# Patient Record
(demographics unavailable — no encounter records)

---

## 2025-04-14 NOTE — PROCEDURE
[Posterior Lesion] : posterior lesion [Anterior rhinoscopy insufficient to account for symptoms] : anterior rhinoscopy insufficient to account for symptoms [Topical Lidocaine] : topical lidocaine [Oxymetazoline HCl] : oxymetazoline HCl [Flexible Endoscope] : examined with the flexible endoscope [Allergic] : allergic signs [] : hypertrophy present bilateral [Normal] : the nasopharynx was normal [Serial Number: ___] : Serial Number: [unfilled] [de-identified] : done because dentist advised her sinuses had matl in them on xray and could not see with speculum [FreeTextEntry6] : Mucosa: b ar Mucus:b nl Polyps:b nl Inferior turbinate:b nl Middle turbinate:b nl Superior turbinate:b nl Inferior Meatus:b nl Middle Meatus:b nl Superior meatus:b nl Sphenoethmoidal recess:b nl

## 2025-04-14 NOTE — PHYSICAL EXAM
[Nasal Endoscopy Performed] : nasal endoscopy was performed, see procedure section for findings [Midline] : trachea located in midline position [Normal] : no rashes [de-identified] : b crepitance

## 2025-04-14 NOTE — ASSESSMENT
[FreeTextEntry1] : recent uri- may have had sinus infx- has sinus and ear complaints but on d 5 of augmentin exam is clear  tymps and ett nl explained best not to fly if ill but if does rec afrin and earplanes rec finiish abx to lay off all meds ordered x augmentin, flonase rtc with sinus ct see dentist for continued tmj mgmt

## 2025-04-14 NOTE — HISTORY OF PRESENT ILLNESS
[de-identified] : 28 yo f had uri last week. She felt pressure in the back of her throat and postnasal drip - when she gets a uri gets this and then coughs- used Navasge after rose this and then felt pressure in b in ears so stopped. The next night felt pain in face in preauricular areas radiating downward and ears felt pressure - she went to urgent care and was told she had an inner ear infection. Has a left upper cavity and went to dentist - told sinuses looked full on xray . She feels something like liquid moving in her ears when she moves her head from side to side. Dentist told her she has tmj they made many recommendations. No fh or sh relevant to cc. Nonsmoker. has a 16 mo old child. Flying to [facility] and wants to assess safety.

## 2025-04-14 NOTE — HISTORY OF PRESENT ILLNESS
[de-identified] : 28 yo f had uri last week. She felt pressure in the back of her throat and postnasal drip - when she gets a uri gets this and then coughs- used Navasge after rose this and then felt pressure in b in ears so stopped. The next night felt pain in face in preauricular areas radiating downward and ears felt pressure - she went to urgent care and was told she had an inner ear infection. Has a left upper cavity and went to dentist - told sinuses looked full on xray . She feels something like liquid moving in her ears when she moves her head from side to side. Dentist told her she has tmj they made many recommendations. No fh or sh relevant to cc. Nonsmoker. has a 16 mo old child. Flying to [facility] and wants to assess safety.

## 2025-04-14 NOTE — PROCEDURE
[Posterior Lesion] : posterior lesion [Anterior rhinoscopy insufficient to account for symptoms] : anterior rhinoscopy insufficient to account for symptoms [Topical Lidocaine] : topical lidocaine [Oxymetazoline HCl] : oxymetazoline HCl [Flexible Endoscope] : examined with the flexible endoscope [Allergic] : allergic signs [] : hypertrophy present bilateral [Normal] : the nasopharynx was normal [Serial Number: ___] : Serial Number: [unfilled] [de-identified] : done because dentist advised her sinuses had matl in them on xray and could not see with speculum [FreeTextEntry6] : Mucosa: b ar Mucus:b nl Polyps:b nl Inferior turbinate:b nl Middle turbinate:b nl Superior turbinate:b nl Inferior Meatus:b nl Middle Meatus:b nl Superior meatus:b nl Sphenoethmoidal recess:b nl

## 2025-04-14 NOTE — PHYSICAL EXAM
[Nasal Endoscopy Performed] : nasal endoscopy was performed, see procedure section for findings [Midline] : trachea located in midline position [Normal] : no rashes [de-identified] : b crepitance

## 2025-04-24 NOTE — HISTORY OF PRESENT ILLNESS
[de-identified] : 9 day F/U visit for this 30yo F with h/o chronic sinusitis. She had a recent URI & was on augmentin. Symptoms have improved. See dentist for tmj management. CT sinus ordered. Here today to review results. She c/o pressure in l maxillary area. She also c/o fluid sensation in her ears - was found to have tmj at last visit. She took course of augmentin and flonase and feels no change.

## 2025-04-24 NOTE — DATA REVIEWED
[de-identified] :   and tymps nl at last visit [de-identified] : ct sinuses reviewed images with pt and radiologist: 1. Left maxillary sinus: Moderate circumferential mucosal thickening/small mucous retention cysts. Blockage of the ostium/infundibulum.   Right maxillary sinus: Mild mucosal thickening/small mucous retention cyst  2. Minimal mucosal thickening in the ethmoidal air cells.  3. Agger nasi cells/frontal air cells contribute to narrowing of the bilateral frontal sinus ostia/frontoethmoidal recesses  4. Chronic leftward deviation of the bony nasal septum with mild hypertrophic left apical nasal spurring impinging the left inferior meatus. Hypertrophic nasal turbinates/mild rhinitis. Right-sided lamellar william.  5. Periapical lucency of the 1st right maxillary molar tooth reflecting odontogenic disease.   6. Degenerative changes of the bilateral temporomandibular joints.

## 2025-04-24 NOTE — ASSESSMENT
[FreeTextEntry1] : 1) l chronic maxillary sinusitis and obstructed omu; augmentin did not resolve, ds try bactrim warned about allergy and sun, ordered flonase may be candidtate for septofess if does not resolve 2) tmj soft diet nsaid see dentist reassured this is referred pain, ears appear ok 3) r upper tooth dz - rec she show cd to her dentist to address rtc 2-3 weeks

## 2025-04-24 NOTE — PROCEDURE
[Posterior Lesion] : posterior lesion [Anterior rhinoscopy insufficient to account for symptoms] : anterior rhinoscopy insufficient to account for symptoms [Topical Lidocaine] : topical lidocaine [Oxymetazoline HCl] : oxymetazoline HCl [Deviated to the Lt] : deviated to the left [Normal] : the paranasal sinuses had no abnormalities [de-identified] : done to check sinus ostis - could not see with speculum - clear [FreeTextEntry6] : Mucosa: b nl Mucus:b nl Polyps:b nl Inferior turbinate:b nl Middle turbinate:b nl Superior turbinate:b nl Inferior Meatus:b nl Middle Meatus:b nl Superior meatus:b nl Sphenoethmoidal recess:b nl

## 2025-04-24 NOTE — PHYSICAL EXAM
[de-identified] : b [Nasal Endoscopy Performed] : nasal endoscopy was performed, see procedure section for findings [Midline] : trachea located in midline position [Normal] : no rashes

## 2025-04-24 NOTE — HISTORY OF PRESENT ILLNESS
[de-identified] : 9 day F/U visit for this 30yo F with h/o chronic sinusitis. She had a recent URI & was on augmentin. Symptoms have improved. See dentist for tmj management. CT sinus ordered. Here today to review results. She c/o pressure in l maxillary area. She also c/o fluid sensation in her ears - was found to have tmj at last visit. She took course of augmentin and flonase and feels no change.

## 2025-04-24 NOTE — PHYSICAL EXAM
[de-identified] : b [Nasal Endoscopy Performed] : nasal endoscopy was performed, see procedure section for findings [Midline] : trachea located in midline position [Normal] : no rashes

## 2025-04-24 NOTE — DATA REVIEWED
[de-identified] :   and tymps nl at last visit [de-identified] : ct sinuses reviewed images with pt and radiologist: 1. Left maxillary sinus: Moderate circumferential mucosal thickening/small mucous retention cysts. Blockage of the ostium/infundibulum.   Right maxillary sinus: Mild mucosal thickening/small mucous retention cyst  2. Minimal mucosal thickening in the ethmoidal air cells.  3. Agger nasi cells/frontal air cells contribute to narrowing of the bilateral frontal sinus ostia/frontoethmoidal recesses  4. Chronic leftward deviation of the bony nasal septum with mild hypertrophic left apical nasal spurring impinging the left inferior meatus. Hypertrophic nasal turbinates/mild rhinitis. Right-sided lamellar william.  5. Periapical lucency of the 1st right maxillary molar tooth reflecting odontogenic disease.   6. Degenerative changes of the bilateral temporomandibular joints.
